# Patient Record
Sex: FEMALE | Race: BLACK OR AFRICAN AMERICAN | NOT HISPANIC OR LATINO | URBAN - METROPOLITAN AREA
[De-identification: names, ages, dates, MRNs, and addresses within clinical notes are randomized per-mention and may not be internally consistent; named-entity substitution may affect disease eponyms.]

---

## 2020-05-24 ENCOUNTER — EMERGENCY (EMERGENCY)
Facility: HOSPITAL | Age: 20
LOS: 0 days | Discharge: HOME | End: 2020-05-25
Attending: STUDENT IN AN ORGANIZED HEALTH CARE EDUCATION/TRAINING PROGRAM
Payer: COMMERCIAL

## 2020-05-24 DIAGNOSIS — M25.561 PAIN IN RIGHT KNEE: ICD-10-CM

## 2020-05-24 DIAGNOSIS — M25.461 EFFUSION, RIGHT KNEE: ICD-10-CM

## 2020-05-24 PROCEDURE — 99284 EMERGENCY DEPT VISIT MOD MDM: CPT

## 2020-05-25 VITALS
RESPIRATION RATE: 16 BRPM | DIASTOLIC BLOOD PRESSURE: 65 MMHG | HEART RATE: 111 BPM | OXYGEN SATURATION: 99 % | TEMPERATURE: 98 F | SYSTOLIC BLOOD PRESSURE: 123 MMHG

## 2020-05-25 VITALS
SYSTOLIC BLOOD PRESSURE: 114 MMHG | HEART RATE: 120 BPM | TEMPERATURE: 98 F | HEIGHT: 64 IN | RESPIRATION RATE: 17 BRPM | DIASTOLIC BLOOD PRESSURE: 72 MMHG | OXYGEN SATURATION: 100 % | WEIGHT: 164.91 LBS

## 2020-05-25 LAB
ALBUMIN SERPL ELPH-MCNC: 3.8 G/DL — SIGNIFICANT CHANGE UP (ref 3.3–5)
ALP SERPL-CCNC: 74 U/L — SIGNIFICANT CHANGE UP (ref 40–120)
ALT FLD-CCNC: 20 U/L — SIGNIFICANT CHANGE UP (ref 12–78)
ANION GAP SERPL CALC-SCNC: 6 MMOL/L — SIGNIFICANT CHANGE UP (ref 5–17)
AST SERPL-CCNC: 10 U/L — LOW (ref 15–37)
B PERT IGG+IGM PNL SER: ABNORMAL
BASOPHILS # BLD AUTO: 0.02 K/UL — SIGNIFICANT CHANGE UP (ref 0–0.2)
BASOPHILS NFR BLD AUTO: 0.2 % — SIGNIFICANT CHANGE UP (ref 0–2)
BILIRUB SERPL-MCNC: 0.6 MG/DL — SIGNIFICANT CHANGE UP (ref 0.2–1.2)
BUN SERPL-MCNC: 15 MG/DL — SIGNIFICANT CHANGE UP (ref 7–23)
CALCIUM SERPL-MCNC: 8.7 MG/DL — SIGNIFICANT CHANGE UP (ref 8.5–10.1)
CHLORIDE SERPL-SCNC: 108 MMOL/L — SIGNIFICANT CHANGE UP (ref 96–108)
CO2 SERPL-SCNC: 25 MMOL/L — SIGNIFICANT CHANGE UP (ref 22–31)
COLOR FLD: SIGNIFICANT CHANGE UP
CREAT SERPL-MCNC: 0.88 MG/DL — SIGNIFICANT CHANGE UP (ref 0.5–1.3)
CRP SERPL-MCNC: 1.1 MG/DL — HIGH (ref 0–0.4)
CRP SERPL-MCNC: 1.22 MG/DL — HIGH (ref 0–0.4)
EOSINOPHIL # BLD AUTO: 0.02 K/UL — SIGNIFICANT CHANGE UP (ref 0–0.5)
EOSINOPHIL NFR BLD AUTO: 0.2 % — SIGNIFICANT CHANGE UP (ref 0–6)
ERYTHROCYTE [SEDIMENTATION RATE] IN BLOOD: 11 MM/HR — SIGNIFICANT CHANGE UP (ref 0–15)
FLUID INTAKE SUBSTANCE CLASS: SIGNIFICANT CHANGE UP
FLUID SEGMENTED GRANULOCYTES: 93 % — SIGNIFICANT CHANGE UP
GLUCOSE SERPL-MCNC: 70 MG/DL — SIGNIFICANT CHANGE UP (ref 70–99)
GRAM STN FLD: SIGNIFICANT CHANGE UP
HCG SERPL-ACNC: <1 MIU/ML — SIGNIFICANT CHANGE UP
HCT VFR BLD CALC: 38.6 % — SIGNIFICANT CHANGE UP (ref 34.5–45)
HGB BLD-MCNC: 12.9 G/DL — SIGNIFICANT CHANGE UP (ref 11.5–15.5)
IMM GRANULOCYTES NFR BLD AUTO: 0.3 % — SIGNIFICANT CHANGE UP (ref 0–1.5)
LYMPHOCYTES # BLD AUTO: 2.34 K/UL — SIGNIFICANT CHANGE UP (ref 1–3.3)
LYMPHOCYTES # BLD AUTO: 24.3 % — SIGNIFICANT CHANGE UP (ref 13–44)
MCHC RBC-ENTMCNC: 29.4 PG — SIGNIFICANT CHANGE UP (ref 27–34)
MCHC RBC-ENTMCNC: 33.4 GM/DL — SIGNIFICANT CHANGE UP (ref 32–36)
MCV RBC AUTO: 87.9 FL — SIGNIFICANT CHANGE UP (ref 80–100)
MONOCYTES # BLD AUTO: 0.52 K/UL — SIGNIFICANT CHANGE UP (ref 0–0.9)
MONOCYTES NFR BLD AUTO: 5.4 % — SIGNIFICANT CHANGE UP (ref 2–14)
MONOS+MACROS # FLD: 6 % — SIGNIFICANT CHANGE UP
NEUTROPHILS # BLD AUTO: 6.68 K/UL — SIGNIFICANT CHANGE UP (ref 1.8–7.4)
NEUTROPHILS NFR BLD AUTO: 69.6 % — SIGNIFICANT CHANGE UP (ref 43–77)
NRBC # BLD: 0 /100 WBCS — SIGNIFICANT CHANGE UP (ref 0–0)
PLATELET # BLD AUTO: 285 K/UL — SIGNIFICANT CHANGE UP (ref 150–400)
POTASSIUM SERPL-MCNC: 3.9 MMOL/L — SIGNIFICANT CHANGE UP (ref 3.5–5.3)
POTASSIUM SERPL-SCNC: 3.9 MMOL/L — SIGNIFICANT CHANGE UP (ref 3.5–5.3)
PROT SERPL-MCNC: 7.7 GM/DL — SIGNIFICANT CHANGE UP (ref 6–8.3)
RBC # BLD: 4.39 M/UL — SIGNIFICANT CHANGE UP (ref 3.8–5.2)
RBC # FLD: 12.8 % — SIGNIFICANT CHANGE UP (ref 10.3–14.5)
RCV VOL RI: HIGH /UL (ref 0–5)
SODIUM SERPL-SCNC: 139 MMOL/L — SIGNIFICANT CHANGE UP (ref 135–145)
SPECIMEN SOURCE: SIGNIFICANT CHANGE UP
SYNOVIAL CRYSTALS CLARITY: ABNORMAL
SYNOVIAL CRYSTALS COLOR: ABNORMAL
SYNOVIAL CRYSTALS ID: SIGNIFICANT CHANGE UP
SYNOVIAL CRYSTALS TUBE: SIGNIFICANT CHANGE UP
TOTAL NUCLEATED CELL COUNT, BODY FLUID: SIGNIFICANT CHANGE UP /UL
TUBE TYPE: SIGNIFICANT CHANGE UP
WBC # BLD: 9.61 K/UL — SIGNIFICANT CHANGE UP (ref 3.8–10.5)
WBC # FLD AUTO: 9.61 K/UL — SIGNIFICANT CHANGE UP (ref 3.8–10.5)

## 2020-05-25 PROCEDURE — 73562 X-RAY EXAM OF KNEE 3: CPT | Mod: 26,RT

## 2020-05-25 RX ORDER — KETOROLAC TROMETHAMINE 30 MG/ML
30 SYRINGE (ML) INJECTION ONCE
Refills: 0 | Status: DISCONTINUED | OUTPATIENT
Start: 2020-05-25 | End: 2020-05-25

## 2020-05-25 RX ORDER — TRAMADOL HYDROCHLORIDE 50 MG/1
1 TABLET ORAL
Qty: 12 | Refills: 0
Start: 2020-05-25 | End: 2020-05-27

## 2020-05-25 RX ADMIN — Medication 30 MILLIGRAM(S): at 03:43

## 2020-05-25 NOTE — ED PROVIDER NOTE - CARE PLAN
Principal Discharge DX:	Knee pain, right Principal Discharge DX:	Knee pain, right  Secondary Diagnosis:	Knee effusion, right

## 2020-05-25 NOTE — CONSULT NOTE ADULT - SUBJECTIVE AND OBJECTIVE BOX
19y Female presents to [facility] with a right knee effusion that began yesterday. Notes she bent down and had sudden pain and swelling to the knee with inability to ambulate. Notes pain mostly along the anteromedial knee, some posterior tightness. No pain prior to the incident or any history of intermittent swelling. No fevers or chills. Unable to ambulate well, limping today. Reports a similar episode last fall, had an atraumatic painful effusion, seen out in [place] at an outpatient orthopedists, reports a bloody aspiration of " 3 vials" that was reportedly clear of any infection. Never followed up. Denies any other joint pain or rashes. No history of structural knee issues. No family or personal history of rheumatological diseases. 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     PAST MEDICAL & SURGICAL HISTORY:  No pertinent past medical history  No significant past surgical history    MEDICATIONS  (STANDING):  ketorolac   Injectable 30 milliGRAM(s) IV Push Once    MEDICATIONS  (PRN):    Allergies    No Known Allergies      T(C): 36.7 (05-25-20 @ 00:01), Max: 36.7 (05-25-20 @ 00:01)  HR: 120 (05-25-20 @ 00:01) (120 - 120)  BP: 114/72 (05-25-20 @ 00:01) (114/72 - 114/72)  RR: 17 (05-25-20 @ 00:01) (17 - 17)  SpO2: 100% (05-25-20 @ 00:01) (100% - 100%)  Wt(kg): --    PE: mild distress due to knee pain, tearful.   RLE (knee):  Skin CDI without redness. Mild warmth.   No deformity.   Held in 15 degrees of knee flexion.   Moderate effusion.   Mostly TTP about anteromedial knee along medial patellar boarder.   Mild medial joint line TTP and in popliteal fossa.  No lateral joint line pain.   ROM 15-40, limited due to pain.   No TTP to hip/leg/ankle/foot   Calf soft NT.   Motor intact GS/TA/FHL/EHL  SILT L2-S1  DP/PT pulses 2+    LLE/BUE:   No bony TTP; Good ROM w/o pain; Exam Unremarkable    Imaging:  XR demonstrating an effusion. No obvious bony lesions.     19F with right knee effusion.     1-2d hx of right knee effusion related to a bending episode.   Hx of similar complaints last year - bloody tap as an outpatient by an outside orthopedist, reportedly negative. No follow up.   No fevers or any significant redness/erythema on exam.   Appears to be mechanical (meniscal tear, cartilage lesion) or potentially rheumatological.   No strong indicators for infection, ESR normal, no fever, no risk factors.   Somewhat ligamentously lax on exam, her contralateral patella however is stable. No history of knee dislocations.   With hx of bloody tap, could raise concern of PVNS vs hemarthrosis from prior knee dislocation (or simply from traumatic tap).   At this time, will give toradol x1.   Reexamine after nsaids.   Keep NPO nonetheless.   Will determine the useful of knee aspiration after repeat exam.   FU CRP.   Will DW Dr. Jenkins. 19y Female presents to  ED with a right knee effusion that began yesterday. Notes she bent down in deep knee flexion, didnt have any pain/pop/mech sx at that time. Developed a knee effusion and swelling about 10-12 hrs later. Notes pain mostly along the anteromedial knee, some posterior tightness. No pain prior to the incident or any history of intermittent swelling. No fevers or chills. Unable to ambulate well, limping today. Reports a similar episode last fall, had an atraumatic painful effusion, seen out in [place] at an outpatient orthopedists, reports a bloody aspiration of " 3 vials" that was reportedly clear of any infection. Never followed up. Denies any other joint pain or rashes. No history of structural knee issues. No family or personal history of rheumatological diseases. 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     PAST MEDICAL & SURGICAL HISTORY:  No pertinent past medical history  No significant past surgical history    MEDICATIONS  (STANDING):  ketorolac   Injectable 30 milliGRAM(s) IV Push Once    MEDICATIONS  (PRN):    Allergies    No Known Allergies      T(C): 36.7 (05-25-20 @ 00:01), Max: 36.7 (05-25-20 @ 00:01)  HR: 120 (05-25-20 @ 00:01) (120 - 120)  BP: 114/72 (05-25-20 @ 00:01) (114/72 - 114/72)  RR: 17 (05-25-20 @ 00:01) (17 - 17)  SpO2: 100% (05-25-20 @ 00:01) (100% - 100%)  Wt(kg): --    PE: mild distress due to knee pain, tearful.   RLE (knee):  Skin CDI without redness. Mild warmth.   No deformity.   Held in 15 degrees of knee flexion.   Moderate effusion.   Mostly TTP about anteromedial knee along medial patellar boarder.   Mild medial joint line TTP and in popliteal fossa.  No lateral joint line pain.   ROM 15-25, limited due to pain.   No TTP to hip/leg/ankle/foot   Calf soft NT.   Motor intact GS/TA/FHL/EHL  SILT L2-S1  DP/PT pulses 2+    LLE/BUE:   No bony TTP; Good ROM w/o pain; Exam Unremarkable    Imaging:  XR demonstrating an effusion. No obvious bony lesions.     19F with right knee effusion.     1-2d hx of right knee effusion that apparently developed (which she presumes) from a deep knee flexion episode.   Hx of similar complaints last year - bloody tap as an outpatient by an outside orthopedist, reportedly negative. No follow up.   No fevers or any significant redness/erythema on exam.   Appears to be mechanical (meniscal tear, cartilage lesion) or potentially rheumatological.   No strong indicators for infection, ESR normal, no fever, no risk factors.   Somewhat ligamentously lax on exam, her contralateral patella however is stable. No history of knee dislocations.   With hx of bloody tap, could raise concern of PVNS vs hemarthrosis from prior knee dislocation (or simply from traumatic tap).   Initial exam 15-25 of motion due to pain but after toradol improved to 0-90 without any mechanical block.  Will likely require outpatient MRI to evaluate for structural abnormalities.   FU CRP.   Will ALMITA Jenkins. 19y Female presents to  ED with a right knee effusion that began yesterday. Notes she bent down in deep knee flexion, didnt have any pain/pop/mech sx at that time. Developed a knee effusion and swelling about 10-12 hrs later. Notes pain mostly along the anteromedial knee, some posterior tightness. No pain prior to the incident or any history of intermittent swelling. No fevers or chills. Unable to ambulate well, limping today. Reports a similar episode last fall, had an atraumatic painful effusion, seen out in [place] at an outpatient orthopedists, reports a bloody aspiration of " 3 vials" that was reportedly clear of any infection. Never followed up. Denies any other joint pain or rashes. No history of structural knee issues. No family or personal history of rheumatological diseases. 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     PAST MEDICAL & SURGICAL HISTORY:  No pertinent past medical history  No significant past surgical history    MEDICATIONS  (STANDING):  ketorolac   Injectable 30 milliGRAM(s) IV Push Once    MEDICATIONS  (PRN):    Allergies    No Known Allergies      T(C): 36.7 (05-25-20 @ 00:01), Max: 36.7 (05-25-20 @ 00:01)  HR: 120 (05-25-20 @ 00:01) (120 - 120)  BP: 114/72 (05-25-20 @ 00:01) (114/72 - 114/72)  RR: 17 (05-25-20 @ 00:01) (17 - 17)  SpO2: 100% (05-25-20 @ 00:01) (100% - 100%)  Wt(kg): --    PE: mild distress due to knee pain, tearful.   RLE (knee):  Skin CDI without redness. Mild warmth.   No deformity.   Held in 15 degrees of knee flexion.   Moderate effusion.   Mostly TTP about anteromedial knee along medial patellar boarder.   Mild medial joint line TTP and in popliteal fossa.  No lateral joint line pain.   ROM 15-25, limited due to pain.   No TTP to hip/leg/ankle/foot   Calf soft NT.   Motor intact GS/TA/FHL/EHL  SILT L2-S1  DP/PT pulses 2+    LLE/BUE:   No bony TTP; Good ROM w/o pain; Exam Unremarkable    Imaging:  XR demonstrating an effusion. No obvious bony lesions.     Procedure: In sterile fashion right knee aspiration yielded ~80cc of bloody/slightly brown synovial fluid. Tolerated well.     19F with right knee effusion.     1-2d hx of right knee effusion that apparently developed (which she presumes) from a deep knee flexion episode.   Hx of similar complaints last fall- bloody tap as an outpatient by an outside orthopedist, reportedly negative. No follow up.   No fevers or any significant redness/erythema on exam.   No strong indicators for infection, ESR normal, no fever, no risk factors.   Initial exam 15-25 of motion due to pain but after toradol improved to 0-90 without any mechanical block.  Knee aspiration done during visit, 80cc of blood/brownish fluid. Sent for analysis.   FU CRP.   With hx of recurrent bloody/brownish tap, could raise concern of PVNS.   Low suspicion for infectious etiology, may be discharged home. Will FU on aspiration results.  Advised to follow up with PCP regarding rheum workup/Lyme titers etc.   Will also follow up with Dr. Jenkins in 1-2 weeks for evaluation, possible imaging as necessary.   Will ALMITA Jenkins. 19y Female presents to  ED with a right knee effusion that began yesterday. Notes she bent down in deep knee flexion, didnt have any pain/pop/mech sx at that time. Developed a knee effusion and swelling about 10-12 hrs later. Notes pain mostly along the anteromedial knee, some posterior tightness. No pain prior to the incident or any history of intermittent swelling. No fevers or chills. Unable to ambulate well, limping today. Reports a similar episode last fall, had an atraumatic painful effusion, seen out in [place] at an outpatient orthopedists, reports a bloody aspiration of " 3 vials" that was reportedly clear of any infection. Never followed up. Denies any other joint pain or rashes. No history of structural knee issues. No family or personal history of rheumatological diseases. 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     PAST MEDICAL & SURGICAL HISTORY:  No pertinent past medical history  No significant past surgical history    MEDICATIONS  (STANDING):  ketorolac   Injectable 30 milliGRAM(s) IV Push Once    MEDICATIONS  (PRN):    Allergies    No Known Allergies      T(C): 36.7 (05-25-20 @ 00:01), Max: 36.7 (05-25-20 @ 00:01)  HR: 120 (05-25-20 @ 00:01) (120 - 120)  BP: 114/72 (05-25-20 @ 00:01) (114/72 - 114/72)  RR: 17 (05-25-20 @ 00:01) (17 - 17)  SpO2: 100% (05-25-20 @ 00:01) (100% - 100%)  Wt(kg): --    PE: mild distress due to knee pain, tearful.   RLE (knee):  Skin CDI without redness. Mild warmth.   No deformity.   Held in 15 degrees of knee flexion.   Moderate effusion.   Mostly TTP about anteromedial knee along medial patellar boarder.   Mild medial joint line TTP and in popliteal fossa.  No lateral joint line pain.   ROM 15-25, limited due to pain.   No TTP to hip/leg/ankle/foot   Calf soft NT.   Motor intact GS/TA/FHL/EHL  SILT L2-S1  DP/PT pulses 2+    LLE/BUE:   No bony TTP; Good ROM w/o pain; Exam Unremarkable    Imaging:  XR demonstrating an effusion. No obvious bony lesions.     Procedure: In sterile fashion right knee aspiration yielded ~80cc of bloody/slightly brown synovial fluid. Tolerated well.     19F with right knee effusion.     1-2d hx of right knee effusion that apparently developed (which she presumes) from a deep knee flexion episode.   Hx of similar complaints last fall- bloody tap as an outpatient by an outside orthopedist, reportedly negative. No follow up.   No fevers or any significant redness/erythema on exam.   No strong indicators for infection, ESR normal, no fever, no risk factors.   Initial exam 15-25 of motion due to pain but after toradol improved to 0-90 without any mechanical block.  Knee aspiration done during visit, 80cc of blood/brownish fluid. Sent for analysis.   FU CRP.   With hx of recurrent bloody/brownish tap, could raise concern of PVNS or bleeding disorder (hemophilia etc)  Low suspicion for infectious etiology, may be discharged home. Will FU on aspiration results.  Advised to follow up with PCP regarding rheum workup/Lyme titers etc.   Will also follow up with Dr. Jenkins in 1-2 weeks prn for evaluation, possible imaging as necessary.   Will ALMITA Jenkins.

## 2020-05-25 NOTE — ED PROVIDER NOTE - OBJECTIVE STATEMENT
18 y/o F with no pertinent PMHx presents to the ED c/o RT knee swelling since Saturday. Patient recalls only bending down with no pain or swelling until this morning. Patient describes pain as 8 out of 10 in severity. Patient endorses taking Motrin for pain this morning. Patient reports having similar symptoms in October 2019 [place] water park and recalls hitting her RT knee. At that time drainage was required but was told no infections. Blood work at that time tested negative for arthritis. Denies redness or trauma to the area. 20 y/o F with no pertinent PMHx presents to the ED c/o RT knee swelling since Saturday. Patient recalls only bending down with no pain or swelling until this morning. Patient describes pain as 8 out of 10 in severity. Patient endorses taking Motrin for pain this morning. Patient reports having similar symptoms in October 2019 while at a water park and recalls hitting her RT knee. At that time drainage was required but was told no infections. Blood work at that time tested negative for arthritis. Denies redness, fever or recent trauma to the area.

## 2020-05-25 NOTE — ED ADULT NURSE NOTE - NSIMPLEMENTINTERV_GEN_ALL_ED
Implemented All Fall Risk Interventions:  [place] to call system. Call bell, personal items and telephone within reach. Instruct patient to call for assistance. Room bathroom lighting operational. Non-slip footwear when patient is off stretcher. Physically safe environment: no spills, clutter or unnecessary equipment. Stretcher in lowest position, wheels locked, appropriate side rails in place. Provide visual cue, wrist band, yellow gown, etc. Monitor gait and stability. Monitor for mental status changes and reorient to person, place, and time. Review medications for side effects contributing to fall risk. Reinforce activity limits and safety measures with patient and family.

## 2020-05-25 NOTE — CONSULT NOTE ADULT - ATTENDING COMMENTS
recurrent atraumatic hemarthrosis.  large differential.  low suspicion for infection.  rheum, lyme, pvns, hemophilia, etc. recommend outpatient w/u including labs and mri. agree with resident assessment and plan

## 2020-05-25 NOTE — ED PROVIDER NOTE - MUSCULOSKELETAL, MLM
Spine appears normal, range of motion is not limited. RT knee tender to touch. Warm to touch with no erythema

## 2020-05-25 NOTE — ED PROVIDER NOTE - CLINICAL SUMMARY MEDICAL DECISION MAKING FREE TEXT BOX
pt presented with right knee pain which started on thursday after bending down, no trauma, xray shows

## 2020-05-25 NOTE — ED PROVIDER NOTE - PROGRESS NOTE DETAILS
ortho called for consult ortho evaluated pt, feels it may be more rheumatologic, recommends to give Toradol and will re assess and possible drainage ortho re evaluated, he will aspirate her right knee then pt can be discharged pt's knee aspirated, pt cleared by ortho for discharge, pt referred to dr gonzáles and recommend follow up with her pmd to for workup (rule out Lyme disease)

## 2020-05-26 PROBLEM — Z78.9 OTHER SPECIFIED HEALTH STATUS: Chronic | Status: ACTIVE | Noted: 2020-05-25

## 2020-05-26 PROBLEM — Z00.00 ENCOUNTER FOR PREVENTIVE HEALTH EXAMINATION: Status: ACTIVE | Noted: 2020-05-26

## 2020-05-29 ENCOUNTER — APPOINTMENT (OUTPATIENT)
Dept: ORTHOPEDIC SURGERY | Facility: CLINIC | Age: 20
End: 2020-05-29
Payer: COMMERCIAL

## 2020-05-29 VITALS
HEART RATE: 98 BPM | WEIGHT: 158 LBS | SYSTOLIC BLOOD PRESSURE: 120 MMHG | HEIGHT: 64 IN | BODY MASS INDEX: 26.98 KG/M2 | DIASTOLIC BLOOD PRESSURE: 82 MMHG

## 2020-05-29 DIAGNOSIS — M25.061 HEMARTHROSIS, RIGHT KNEE: ICD-10-CM

## 2020-05-29 PROCEDURE — 99203 OFFICE O/P NEW LOW 30 MIN: CPT

## 2020-05-29 NOTE — HISTORY OF PRESENT ILLNESS
[de-identified] : 19 y.o presents today with c/o of right knee pain. She is seen in the emergency room several days ago. She noticed significant pain and swelling in her knee. It was found to have a large hemarthrosis. This is her second such episode. She's had no injuries in the past no fevers no chills. She reports having blood work after the last episode which was normal. Her pain is improved since the aspiration\par \par The patient's past medical history, past surgical history, medications, allergies, and social history were reviewed by me today with the patient and documented accordingly. In addition, the patient's family history, which is noncontributory to this visit, was also reviewed.\par

## 2020-05-29 NOTE — PHYSICAL EXAM
[de-identified] : Radiographs obtained at the hospital were reviewed. Joint spaces are well maintained. No fracture. There is any effusion\par \par  [de-identified] : General Exam\par \par Well developed, well nourished\par No apparent distress\par Oriented to person, place, and time\par Mood: Normal\par Affect: Normal\par Balance and coordination: Normal\par Gait: Normal\par \par Right knee exam\par \par Skin: Clean, dry, intact\par Inspection: No obvious malalignment, no masses, no swelling, + effusion.\par Tenderness: + MJLT. + LJLT. No tenderness over the medial and lateral patella facets. No ttp medial/lateral epicondyle, patella tendon, tibial tubercle, pes anserinus, or proximal fibula.\par ROM: 0 to 130° + pain with deep flexion in right knee none in left\par Stability: Stable to varus, valgus, lachman testing. Negative anterior/posterior drawer.\par Additional tests: Negative McMurrays test, Negative patellar grind test. \par Strength: 5/5 Q/H/TA/GS/EHL, no atrophy\par Neuro: In tact to light touch throughout in dp/sp/tib/keo/saph nerve districutions, DTR's normal\par Pulses: 2+ DP/PT pulses.\par

## 2020-05-29 NOTE — DISCUSSION/SUMMARY
[de-identified] : 19-year-old female recurrent atraumatic right knee hemarthrosis. We will send screening blood work today for rheumatologic causes of monoarticular arthritis including SUSAN rheumatoid factor Lyme disease. We will obtain an MRI to screen for interarticular problems such as PVNS. She will follow up after MRI. It is normal we'll consider hematology evaluation. All questions answered

## 2020-06-02 DIAGNOSIS — Z56.0 UNEMPLOYMENT, UNSPECIFIED: ICD-10-CM

## 2020-06-02 DIAGNOSIS — Z78.9 OTHER SPECIFIED HEALTH STATUS: ICD-10-CM

## 2020-06-02 SDOH — ECONOMIC STABILITY - INCOME SECURITY: UNEMPLOYMENT, UNSPECIFIED: Z56.0

## 2020-06-03 ENCOUNTER — APPOINTMENT (OUTPATIENT)
Dept: ORTHOPEDIC SURGERY | Facility: CLINIC | Age: 20
End: 2020-06-03
Payer: COMMERCIAL

## 2020-06-03 VITALS — TEMPERATURE: 98.7 F

## 2020-06-03 PROCEDURE — 99213 OFFICE O/P EST LOW 20 MIN: CPT

## 2020-06-03 NOTE — PHYSICAL EXAM
[de-identified] : Right knee exam\par \par Skin: Clean, dry, intact\par Inspection: No obvious malalignment, no masses, no swelling, + effusion.\par Tenderness: + MJLT. + LJLT. No tenderness over the medial and lateral patella facets. No ttp medial/lateral epicondyle, patella tendon, tibial tubercle, pes anserinus, or proximal fibula.\par ROM: 0 to 130° + pain with deep flexion in right knee none in left\par Stability: Stable to varus, valgus, lachman testing. Negative anterior/posterior drawer.\par Additional tests: Negative McMurrays test, Negative patellar grind test. \par Strength: 5/5 Q/H/TA/GS/EHL, no atrophy\par Neuro: In tact to light touch throughout in dp/sp/tib/keo/saph nerve districutions, DTR's normal\par Pulses: 2+ DP/PT pulses. [de-identified] : MRI right knee reviewed. There is a large nodule that is dark on both T1 and T2 imaging consistent with pigmented villonodular synovitis.\par \par

## 2020-06-03 NOTE — HISTORY OF PRESENT ILLNESS
[de-identified] : 19 y.o presents today with c/o of right knee pain. She is seen in the emergency room several days ago. She noticed significant pain and swelling in her knee. It was found to have a large hemarthrosis. This is her second such episode. She's had no injuries in the past no fevers no chills. She reports having blood work after the last episode which was normal. Her pain is improved since the aspiration.  A MRI was done since last visit, here to review results today.\par

## 2020-06-03 NOTE — DISCUSSION/SUMMARY
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 We discussed treatment options at length both surgical and nonsurgical. We discussed nonsurgical treatment with activity modification physical therapy. We discussed risk of recurrence of her pain and hemarthrosis. We discussed surgical treatment. Right knee arthroscopy excision of nodular PVNS and partial synovectomy. We discussed the surgery postoperative protocol and restrictions in detail. Risks benefits alternatives of surgery discussed included but not limited to risks such as bleeding infection nerve and vessel injury continued pain and stiffness in her future surgery medical complications such as DVT or PE anesthesia complications. All questions are answered. She will schedule at her convenience

## 2020-06-08 LAB
CULTURE RESULTS: SIGNIFICANT CHANGE UP
GRAM STN FLD: SIGNIFICANT CHANGE UP
SPECIMEN SOURCE: SIGNIFICANT CHANGE UP

## 2020-06-20 ENCOUNTER — EMERGENCY (EMERGENCY)
Facility: HOSPITAL | Age: 20
LOS: 0 days | Discharge: HOME | End: 2020-06-20
Payer: COMMERCIAL

## 2020-06-20 VITALS
RESPIRATION RATE: 16 BRPM | WEIGHT: 160.06 LBS | DIASTOLIC BLOOD PRESSURE: 73 MMHG | SYSTOLIC BLOOD PRESSURE: 106 MMHG | HEIGHT: 64 IN | TEMPERATURE: 99 F | HEART RATE: 114 BPM | OXYGEN SATURATION: 100 %

## 2020-06-20 DIAGNOSIS — M25.561 PAIN IN RIGHT KNEE: ICD-10-CM

## 2020-06-20 DIAGNOSIS — M25.461 EFFUSION, RIGHT KNEE: ICD-10-CM

## 2020-06-20 LAB
ALBUMIN SERPL ELPH-MCNC: 3.6 G/DL — SIGNIFICANT CHANGE UP (ref 3.3–5)
ALP SERPL-CCNC: 75 U/L — SIGNIFICANT CHANGE UP (ref 40–120)
ALT FLD-CCNC: 18 U/L — SIGNIFICANT CHANGE UP (ref 12–78)
ANION GAP SERPL CALC-SCNC: 6 MMOL/L — SIGNIFICANT CHANGE UP (ref 5–17)
AST SERPL-CCNC: 13 U/L — LOW (ref 15–37)
BILIRUB SERPL-MCNC: 0.8 MG/DL — SIGNIFICANT CHANGE UP (ref 0.2–1.2)
BUN SERPL-MCNC: 14 MG/DL — SIGNIFICANT CHANGE UP (ref 7–23)
CALCIUM SERPL-MCNC: 9.2 MG/DL — SIGNIFICANT CHANGE UP (ref 8.5–10.1)
CHLORIDE SERPL-SCNC: 107 MMOL/L — SIGNIFICANT CHANGE UP (ref 96–108)
CO2 SERPL-SCNC: 25 MMOL/L — SIGNIFICANT CHANGE UP (ref 22–31)
CREAT SERPL-MCNC: 0.76 MG/DL — SIGNIFICANT CHANGE UP (ref 0.5–1.3)
GLUCOSE SERPL-MCNC: 86 MG/DL — SIGNIFICANT CHANGE UP (ref 70–99)
HCT VFR BLD CALC: 38.3 % — SIGNIFICANT CHANGE UP (ref 34.5–45)
HGB BLD-MCNC: 12.8 G/DL — SIGNIFICANT CHANGE UP (ref 11.5–15.5)
MCHC RBC-ENTMCNC: 29.6 PG — SIGNIFICANT CHANGE UP (ref 27–34)
MCHC RBC-ENTMCNC: 33.4 GM/DL — SIGNIFICANT CHANGE UP (ref 32–36)
MCV RBC AUTO: 88.7 FL — SIGNIFICANT CHANGE UP (ref 80–100)
NRBC # BLD: 0 /100 WBCS — SIGNIFICANT CHANGE UP (ref 0–0)
PLATELET # BLD AUTO: 260 K/UL — SIGNIFICANT CHANGE UP (ref 150–400)
POTASSIUM SERPL-MCNC: 3.8 MMOL/L — SIGNIFICANT CHANGE UP (ref 3.5–5.3)
POTASSIUM SERPL-SCNC: 3.8 MMOL/L — SIGNIFICANT CHANGE UP (ref 3.5–5.3)
PROT SERPL-MCNC: 7.8 GM/DL — SIGNIFICANT CHANGE UP (ref 6–8.3)
RBC # BLD: 4.32 M/UL — SIGNIFICANT CHANGE UP (ref 3.8–5.2)
RBC # FLD: 12.8 % — SIGNIFICANT CHANGE UP (ref 10.3–14.5)
SODIUM SERPL-SCNC: 138 MMOL/L — SIGNIFICANT CHANGE UP (ref 135–145)
WBC # BLD: 11.02 K/UL — HIGH (ref 3.8–10.5)
WBC # FLD AUTO: 11.02 K/UL — HIGH (ref 3.8–10.5)

## 2020-06-20 PROCEDURE — 99284 EMERGENCY DEPT VISIT MOD MDM: CPT

## 2020-06-20 PROCEDURE — 73560 X-RAY EXAM OF KNEE 1 OR 2: CPT | Mod: 26,RT

## 2020-06-20 RX ORDER — AMPICILLIN SODIUM AND SULBACTAM SODIUM 250; 125 MG/ML; MG/ML
3 INJECTION, POWDER, FOR SUSPENSION INTRAMUSCULAR; INTRAVENOUS ONCE
Refills: 0 | Status: COMPLETED | OUTPATIENT
Start: 2020-06-20 | End: 2020-06-20

## 2020-06-20 RX ORDER — KETOROLAC TROMETHAMINE 30 MG/ML
30 SYRINGE (ML) INJECTION ONCE
Refills: 0 | Status: DISCONTINUED | OUTPATIENT
Start: 2020-06-20 | End: 2020-06-20

## 2020-06-20 RX ORDER — IBUPROFEN 200 MG
1 TABLET ORAL
Qty: 28 | Refills: 0
Start: 2020-06-20 | End: 2020-06-26

## 2020-06-20 RX ORDER — CEPHALEXIN 500 MG
1 CAPSULE ORAL
Qty: 28 | Refills: 0
Start: 2020-06-20 | End: 2020-06-26

## 2020-06-20 RX ADMIN — Medication 30 MILLIGRAM(S): at 17:46

## 2020-06-20 RX ADMIN — AMPICILLIN SODIUM AND SULBACTAM SODIUM 200 GRAM(S): 250; 125 INJECTION, POWDER, FOR SUSPENSION INTRAMUSCULAR; INTRAVENOUS at 19:05

## 2020-06-20 RX ADMIN — AMPICILLIN SODIUM AND SULBACTAM SODIUM 3 GRAM(S): 250; 125 INJECTION, POWDER, FOR SUSPENSION INTRAMUSCULAR; INTRAVENOUS at 19:44

## 2020-06-20 NOTE — ED PROVIDER NOTE - CLINICAL SUMMARY MEDICAL DECISION MAKING FREE TEXT BOX
18 y/o F with right knee effusion, NAD, xray reveals small suprapatellar effusion, pt has appointment for knee arthroscopy. she was dc home to keep her scheduled appointment and f/u with PMD

## 2020-06-20 NOTE — ED PROVIDER NOTE - PATIENT PORTAL LINK FT
You can access the FollowMyHealth Patient Portal offered by [facility] by registering at the following website: [email or web address]. By joining [facility]’s FollowMyHealth portal, you will also be able to view your health information using other applications (apps) compatible with our system.

## 2020-06-20 NOTE — ED PROVIDER NOTE - MUSCULOSKELETAL, MLM
Spine appears normal, range of motion is not limited, right knee TTP warm to touch with limited ROM and strength due to pain, sensation, NVI, + suprapatellar swelling with antalgic gait.

## 2020-06-20 NOTE — ED PROVIDER NOTE - OBJECTIVE STATEMENT
18 y/o F with hx of knee effusion presents to ED c/o right knee swelling and pain x 2 days pt was seen here for same concerns, pt has appointment for right knee arthroscopy in july no associated symptoms denies trauma, fever, numbness and other concerns.

## 2020-06-20 NOTE — ED ADULT NURSE NOTE - OBJECTIVE STATEMENT
c/o right knee swelling and pain to right knee x 2 days denies any form of trauma. States she has difficulty bearing weight to right leg.

## 2020-07-01 ENCOUNTER — OUTPATIENT (OUTPATIENT)
Dept: OUTPATIENT SERVICES | Facility: HOSPITAL | Age: 20
LOS: 1 days | Discharge: HOME | End: 2020-07-01

## 2020-07-01 VITALS
TEMPERATURE: 98 F | WEIGHT: 150.58 LBS | RESPIRATION RATE: 17 BRPM | SYSTOLIC BLOOD PRESSURE: 115 MMHG | HEIGHT: 64 IN | OXYGEN SATURATION: 99 % | HEART RATE: 88 BPM | DIASTOLIC BLOOD PRESSURE: 67 MMHG

## 2020-07-01 DIAGNOSIS — M12.261 VILLONODULAR SYNOVITIS (PIGMENTED), RIGHT KNEE: ICD-10-CM

## 2020-07-01 DIAGNOSIS — Z01.818 ENCOUNTER FOR OTHER PREPROCEDURAL EXAMINATION: ICD-10-CM

## 2020-07-01 LAB — HCG UR QL: NEGATIVE — SIGNIFICANT CHANGE UP

## 2020-07-01 NOTE — H&P PST ADULT - HISTORY OF PRESENT ILLNESS
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), right knee here for PST for scheduled right knee arthroscopy excision of nodular pigmented villonodular synovitis and partial synovectomy  This patient denies fever, cough, sob, rash, flu like symptoms or travel outside of the [place] in the past 30 days

## 2020-07-01 NOTE — H&P PST ADULT - ASSESSMENT
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), right knee here for PST for scheduled right knee arthroscopy excision of nodular pigmented villonodular synovitis and partial synovectomy  CAPRINI SCORE    AGE RELATED RISK FACTORS                                                       MOBILITY RELATED FACTORS  [ ] Age 41-60 years                                            (1 Point)                  [ ] Bed rest                                                        (1 Point)  [ ] Age: 61-74 years                                           (2 Points)                [ ] Plaster cast                                                   (2 Points)  [ ] Age= 75 years                                              (3 Points)                 [ ] Bed bound for more than 72 hours                   (2 Points)    DISEASE RELATED RISK FACTORS                                               GENDER SPECIFIC FACTORS  [ ] Edema in the lower extremities                       (1 Point)                  [ ] Pregnancy                                                     (1 Point)  [ ] Varicose veins                                               (1 Point)                  [ ] Post-partum < 6 weeks                                   (1 Point)             [ ] BMI > 25 Kg/m2                                            (1 Point)                  [ ] Hormonal therapy  or oral contraception            (1 Point)                 [ ] Sepsis (in the previous month)                        (1 Point)                  [ ] History of pregnancy complications  [ ] Pneumonia or serious lung disease                                               [ ] Unexplained or recurrent                       (1 Point)           (in the previous month)                               (1 Point)  [ ] Abnormal pulmonary function test                     (1 Point)                 SURGERY RELATED RISK FACTORS  [ ] Acute myocardial infarction                              (1 Point)                 [ ]  Section                                            (1 Point)  [ ] Congestive heart failure (in the previous month)  (1 Point)                 [ ] Minor surgery                                                 (1 Point)   [ ] Inflammatory bowel disease                             (1 Point)                 [x Arthroscopic surgery                                        (2 Points)  [ ] Central venous access                                    (2 Points)                [ ] General surgery lasting more than 45 minutes   (2 Points)       [ ] Stroke (in the previous month)                          (5 Points)               [ ] Elective arthroplasty                                        (5 Points)                                                                                                                                               HEMATOLOGY RELATED FACTORS                                                 TRAUMA RELATED RISK FACTORS  [ ] Prior episodes of VTE                                     (3 Points)                 [ ] Fracture of the hip, pelvis, or leg                       (5 Points)  [ ] Positive family history for VTE                         (3 Points)                 [ ] Acute spinal cord injury (in the previous month)  (5 Points)  [ ] Prothrombin 46804 A                                      (3 Points)                 [ ] Paralysis  (less than 1 month)                          (5 Points)  [ ] Factor V Leiden                                             (3 Points)                 [ ] Multiple Trauma within 1 month                         (5 Points)  [ ] Lupus anticoagulants                                     (3 Points)                                                           [ ] Anticardiolipin antibodies                                (3 Points)                                                       [ ] High homocysteine in the blood                      (3 Points)                                             [ ] Other congenital or acquired thrombophilia       (3 Points)                                                [ ] Heparin induced thrombocytopenia                  (3 Points)                                          Total Score [      2    ]

## 2020-07-01 NOTE — H&P PST ADULT - NSICDXPROBLEM_GEN_ALL_CORE_FT
PROBLEM DIAGNOSES  Problem: Villonodular synovitis (pigmented), right knee  Assessment and Plan: Right knee arthroscopy excision of nodular pigmented villonodular synovitis and patrial synovectomy  Pre-op instructions given by RN, patient verbalized understanding  Chlorhexidine wash instructions given   Patient to return on 7-6-2020 for COVID testing

## 2020-07-06 LAB — SARS-COV-2 RNA SPEC QL NAA+PROBE: SIGNIFICANT CHANGE UP

## 2020-07-08 ENCOUNTER — TRANSCRIPTION ENCOUNTER (OUTPATIENT)
Age: 20
End: 2020-07-08

## 2020-07-09 ENCOUNTER — RESULT REVIEW (OUTPATIENT)
Age: 20
End: 2020-07-09

## 2020-07-09 ENCOUNTER — APPOINTMENT (OUTPATIENT)
Dept: ORTHOPEDIC SURGERY | Facility: HOSPITAL | Age: 20
End: 2020-07-09

## 2020-07-09 ENCOUNTER — OUTPATIENT (OUTPATIENT)
Dept: OUTPATIENT SERVICES | Facility: HOSPITAL | Age: 20
LOS: 1 days | Discharge: HOME | End: 2020-07-09
Payer: COMMERCIAL

## 2020-07-09 VITALS
SYSTOLIC BLOOD PRESSURE: 113 MMHG | DIASTOLIC BLOOD PRESSURE: 69 MMHG | HEART RATE: 67 BPM | OXYGEN SATURATION: 100 % | RESPIRATION RATE: 14 BRPM

## 2020-07-09 VITALS
SYSTOLIC BLOOD PRESSURE: 126 MMHG | OXYGEN SATURATION: 99 % | HEIGHT: 64 IN | RESPIRATION RATE: 14 BRPM | WEIGHT: 150.58 LBS | DIASTOLIC BLOOD PRESSURE: 62 MMHG | TEMPERATURE: 99 F | HEART RATE: 66 BPM

## 2020-07-09 PROCEDURE — 29876 ARTHRS KNEE SURG SYNVCT MAJ: CPT | Mod: RT

## 2020-07-09 PROCEDURE — 88304 TISSUE EXAM BY PATHOLOGIST: CPT | Mod: 26

## 2020-07-09 RX ORDER — HYDROMORPHONE HYDROCHLORIDE 2 MG/ML
0.5 INJECTION INTRAMUSCULAR; INTRAVENOUS; SUBCUTANEOUS
Refills: 0 | Status: DISCONTINUED | OUTPATIENT
Start: 2020-07-09 | End: 2020-07-09

## 2020-07-09 RX ORDER — SODIUM CHLORIDE 9 MG/ML
3 INJECTION INTRAMUSCULAR; INTRAVENOUS; SUBCUTANEOUS EVERY 8 HOURS
Refills: 0 | Status: DISCONTINUED | OUTPATIENT
Start: 2020-07-09 | End: 2020-07-09

## 2020-07-09 RX ORDER — HYDROMORPHONE HYDROCHLORIDE 2 MG/ML
0.25 INJECTION INTRAMUSCULAR; INTRAVENOUS; SUBCUTANEOUS
Refills: 0 | Status: DISCONTINUED | OUTPATIENT
Start: 2020-07-09 | End: 2020-07-09

## 2020-07-09 RX ORDER — SODIUM CHLORIDE 9 MG/ML
1000 INJECTION, SOLUTION INTRAVENOUS
Refills: 0 | Status: DISCONTINUED | OUTPATIENT
Start: 2020-07-09 | End: 2020-07-09

## 2020-07-09 RX ORDER — DOCUSATE SODIUM 100 MG
1 CAPSULE ORAL
Qty: 60 | Refills: 0
Start: 2020-07-09

## 2020-07-09 RX ORDER — ONDANSETRON 8 MG/1
1 TABLET, FILM COATED ORAL
Qty: 10 | Refills: 0
Start: 2020-07-09

## 2020-07-09 RX ORDER — ONDANSETRON 8 MG/1
4 TABLET, FILM COATED ORAL ONCE
Refills: 0 | Status: DISCONTINUED | OUTPATIENT
Start: 2020-07-09 | End: 2020-07-09

## 2020-07-09 RX ORDER — ASPIRIN/CALCIUM CARB/MAGNESIUM 324 MG
1 TABLET ORAL
Qty: 14 | Refills: 0
Start: 2020-07-09 | End: 2020-07-22

## 2020-07-09 NOTE — BRIEF OPERATIVE NOTE - NSICDXBRIEFPOSTOP_GEN_ALL_CORE_FT
POST-OP DIAGNOSIS:  PVNS (pigmented villonodular synovitis) 09-Jul-2020 10:47:33 right knee Irvin Uriostegui

## 2020-07-09 NOTE — ASU DISCHARGE PLAN (ADULT/PEDIATRIC) - CARE PROVIDER_API CALL
Marcelo Jenkins  ORTHOPAEDIC SURGERY  [address], [address]  [place] [address]  Phone: [phone]  Fax: [phone]  Follow Up Time:

## 2020-07-09 NOTE — ASU DISCHARGE PLAN (ADULT/PEDIATRIC) - CALL YOUR DOCTOR IF YOU HAVE ANY OF THE FOLLOWING:
Wound/Surgical Site with redness, or foul smelling discharge or pus/Fever greater than (need to indicate Fahrenheit or Celsius)/Bleeding that does not stop/Numbness, tingling, color or temperature change to extremity/Pain not relieved by Medications/Swelling that gets worse

## 2020-07-09 NOTE — BRIEF OPERATIVE NOTE - NSICDXBRIEFPREOP_GEN_ALL_CORE_FT
PRE-OP DIAGNOSIS:  PVNS (pigmented villonodular synovitis) 09-Jul-2020 10:47:07 right knee Irvin Uriostegui

## 2020-07-10 LAB — SURGICAL PATHOLOGY STUDY: SIGNIFICANT CHANGE UP

## 2020-07-15 DIAGNOSIS — M12.261 VILLONODULAR SYNOVITIS (PIGMENTED), RIGHT KNEE: ICD-10-CM

## 2020-07-22 ENCOUNTER — APPOINTMENT (OUTPATIENT)
Dept: ORTHOPEDIC SURGERY | Facility: CLINIC | Age: 20
End: 2020-07-22
Payer: COMMERCIAL

## 2020-07-22 VITALS — TEMPERATURE: 98.7 F

## 2020-07-22 DIAGNOSIS — M12.261 VILLONODULAR SYNOVITIS (PIGMENTED), RIGHT KNEE: ICD-10-CM

## 2020-07-22 PROCEDURE — 99024 POSTOP FOLLOW-UP VISIT: CPT

## 2020-07-22 NOTE — HISTORY OF PRESENT ILLNESS
[de-identified] : R knee [de-identified] : Right knee exam\par Incisions are healing well no erythema\par Mild effusion\par Range of motion 0-°100\par no Medial joint line tenderness\par calf is soft and nontender\par Able to flex and extend all toes\par Sensation intact throughout\par brisk capillary refill.\par  [de-identified] : 20yo F s/p Right knee arthroscopy, synovectomy with excision of PVNS lesion, 7/9/20. Doign well.  pain contreolled. not yet started PT [de-identified] : We reviewed postoperative protocol restrictions reviewed and she progressed very continued physical therapy. Weightbearing as tolerated. Tylenol or anti-inflammatory medications as needed for pain. Followup one month [de-identified] : 18yo F s/p Right knee arthroscopy, synovectomy with excision of PVNS lesion, 7/9/20.

## 2021-04-23 NOTE — ED ADULT NURSE NOTE - PAIN: PRESENCE, MLM
complains of pain/discomfort
Detail Level: Simple
Render Risk Assessment In Note?: no
Comment: Previously biopsied 1/8/2021

## 2024-08-27 NOTE — ED PROVIDER NOTE - PATIENT PORTAL LINK FT
good, to achieve stated therapy goals You can access the FollowMyHealth Patient Portal offered by [facility] by registering at the following website: [email or web address]. By joining [email or web address]’s FollowMyHealth portal, you will also be able to view your health information using other applications (apps) compatible with our system.

## 2025-07-18 NOTE — ED ADULT NURSE NOTE - OBJECTIVE STATEMENT
--DO NOT REPLY - Sent from PACT - If sent to wrong pool, reroute to P [facility] Reroute pool --    General Message: Patient is returning a call concerning her results   Callback #: [phone]  Can a detailed message be left? Yes - Voicemail   Caller has been advised this message will be addressed within:1 business day [high priority]         Received patient in triage. AOX4. c/o right knee pain 10/10. States fluids in right knee was drained in october 30th. Just woke up yesterday with swelling. Leg elevated and edema +3 noted on knee area. Awaiting to be seen